# Patient Record
Sex: FEMALE | Race: WHITE
[De-identification: names, ages, dates, MRNs, and addresses within clinical notes are randomized per-mention and may not be internally consistent; named-entity substitution may affect disease eponyms.]

---

## 2018-01-11 ENCOUNTER — HOSPITAL ENCOUNTER (OUTPATIENT)
Dept: HOSPITAL 45 - C.PATHSPEC | Age: 23
Discharge: HOME | End: 2018-01-11
Attending: SURGERY
Payer: COMMERCIAL

## 2018-01-11 DIAGNOSIS — L72.0: Primary | ICD-10-CM

## 2018-01-23 ENCOUNTER — HOSPITAL ENCOUNTER (EMERGENCY)
Dept: HOSPITAL 45 - C.EDB | Age: 23
Discharge: HOME | End: 2018-01-23
Payer: COMMERCIAL

## 2018-01-23 VITALS
DIASTOLIC BLOOD PRESSURE: 74 MMHG | SYSTOLIC BLOOD PRESSURE: 117 MMHG | HEART RATE: 113 BPM | TEMPERATURE: 100.58 F | OXYGEN SATURATION: 98 %

## 2018-01-23 VITALS
HEIGHT: 67.01 IN | HEIGHT: 67.01 IN | WEIGHT: 223.11 LBS | BODY MASS INDEX: 35.02 KG/M2 | WEIGHT: 223.11 LBS | BODY MASS INDEX: 35.02 KG/M2

## 2018-01-23 DIAGNOSIS — R05: Primary | ICD-10-CM

## 2018-01-23 DIAGNOSIS — R50.9: ICD-10-CM

## 2018-01-23 DIAGNOSIS — Z87.01: ICD-10-CM

## 2018-01-23 DIAGNOSIS — R19.7: ICD-10-CM

## 2018-01-23 DIAGNOSIS — R52: ICD-10-CM

## 2018-01-23 DIAGNOSIS — J45.909: ICD-10-CM

## 2018-01-23 NOTE — DIAGNOSTIC IMAGING REPORT
CHEST ONE VIEW PORTABLE



CLINICAL HISTORY: Cough and fever. Sore throat.    



COMPARISON STUDY:  No previous studies for comparison.



FINDINGS: Patient is rotated. Lung volumes are normal. No pneumothorax or

pleural effusion is noted.

Pulmonary vascularity is normal. Lungs are clear. Cardiomediastinal silhouette

is unremarkable. 



IMPRESSION:  No acute cardiopulmonary findings. 









Electronically signed by:  Jovany Somers M.D.

1/23/2018 9:04 PM



Dictated Date/Time:  1/23/2018 9:04 PM

## 2018-01-23 NOTE — EMERGENCY ROOM VISIT NOTE
History


Report prepared by Jerrod:  Sima Martinez


Under the Supervision of:  Dr. Bertram Berry M.D.


First contact with patient:  20:29


Chief Complaint:  FLU LIKE SX


Stated Complaint:  SORE THROAT,FEVER,CONGESTION,RANDOM SOB





History of Present Illness


The patient is a 22 year old female who presents to the Emergency Room with 

complaints of constant generalized illness beginning three days ago. The 

patient is in the room with a friend. The patient reports a runny nose, a dry 

cough, diarrhea, fever, chills, and body aches. She notes slight chest 

tightness and difficulty breathing beginning yesterday. Today, the patient 

started to have a sorethroat. She denies any ear pain, vomiting, or urinary 

symptoms. The patient got a flu shot this year. She denies any sick contact. 

The patient has a history of asthma and pneumonia when she was a young child.





   Source of History:  patient


   Onset:  three days ago


   Position:  other (generalized)


   Quality:  other (illness)


   Timing:  other (constant)


   Associated Symptoms:  + fevers, + chills, + sorethroat, + cough, + SOB, + 

diarrhea, No vomiting, No urinary symptoms





Review of Systems


See HPI for pertinent positives & negatives. A total of 10 systems reviewed and 

were otherwise negative.





Past Medical & Surgical


Medical Problems:


(1) Asthma








Family History





Patient reports no known family medical history.





Social History


Smoking Status:  Never Smoker


Occupation Status:  [employer] student





Current/Historical Medications


Scheduled


Vitamin A (Vitamin A), 1 CAP PO DAILY





Scheduled PRN


Benzonatate (Tessalon Perles), 100-200 MG PO TID PRN for Cough





Allergies


Coded Allergies:  


     No Known Allergies (Unverified , 1/23/18)





Physical Exam


Vital Signs











  Date Time  Temp Pulse Resp B/P (MAP) Pulse Ox O2 Delivery O2 Flow Rate FiO2


 


1/23/18 22:09 38.1 113 20 117/47 98   


 


1/23/18 22:09 38.1 113 20 117/74 98 Room Air  


 


1/23/18 20:19 39.4 124 18 133/78 98 Room Air  











Physical Exam


GENERAL: Patient is in no acute distress.


HEENT: No acute trauma, normocephalic atraumatic, mucous membranes moist, no 

scleral icterus, moderate nasal congestion, no throat erythema or exudate.


NECK: No stridor, no adenopathy, no meningismus, trachea is midline.


LUNGS: Clear to auscultation bilaterally, no wheeze, no rhonchi, breath sounds 

equal.


HEART: Tachycardic with regular rhythm, no murmurs.


ABDOMEN: Soft, nontender, bowel sounds positive, no hernias, no peritonitis.


EXTREMITIES: No cyanosis or edema, full range of motion of all the joints 

without pain or difficulty, no signs for acute trauma.


NEUROLOGIC: Oriented x 3, no acute motor or sensory deficits, no focal weakness.


SKIN: No rash, no jaundice, no diaphoresis.





Medical Decision & Procedures


ER Provider


Diagnostic Interpretation:


Radiology results as stated below per my review and radiologist interpretation:





CHEST ONE VIEW PORTABLE





FINDINGS: Patient is rotated. Lung volumes are normal. No pneumothorax or


pleural effusion is noted.


Pulmonary vascularity is normal. Lungs are clear. Cardiomediastinal silhouette


is unremarkable. 





IMPRESSION:  No acute cardiopulmonary findings. 








Electronically signed by:  Jovany Somers M.D.





Medications Administered











 Medications


  (Trade)  Dose


 Ordered  Sig/[place]


 Route  Start Time


 Stop Time Status Last Admin


Dose Admin


 


 Ibuprofen


  (Motrin Tab)  600 mg  NOW  STAT


 PO  1/23/18 20:35


 1/23/18 20:37 DC 1/23/18 20:57


600 MG


 


 Acetaminophen


  (Tylenol Tab)  1,000 mg  NOW  STAT


 PO  1/23/18 20:35


 1/23/18 20:37 DC 1/23/18 20:56


1,000 MG


 


 Albuterol


  (Ventolin Hfa


 Inhaler)  3 puffs  NOW  ONCE


 INH  1/23/18 20:45


 1/23/18 20:46 DC 1/23/18 20:57


3 PUFFS


 


 Benzonatate


  (Tessalon Perles


 Cap)  100 mg  NOW  ONCE


 PO  1/23/18 20:45


 1/23/18 20:46 DC 1/23/18 20:57


100 MG











ED Course


2030: The patient was evaluated in room C6. A complete history and physical 

exam was performed.





2035: Ordered Acetaminophen 1000 mg PO, Ibuprofen 600 mg PO. 





2045: Ordered Benzonatate 100 mg PO, Albuterol 3 puffs INH. 





2133: I updated the patient on her test results. She is ready to go home. 





2153: Reevaluated the patient. Discussed results and discharge instructions: 

SHe verbalized understanding and agreement. The patient is ready for discharge.





Medical Decision


Differential diagnoses: influenza or flu-like symptoms, pneumonia, dehydration, 

pharyngitis, otitis media, sinusitis.





The patient presents with flulike symptoms.  On exam, she was febrile with a 

mild tachycardia.  Chest film does not show pneumonia.  She was not toxic.  No 

evidence for pharyngitis on exam.





The patient was given oral Motrin and oral Tylenol.  She was given albuterol 

via [facility].  She received Tessalon Perles orally for cough.





The patient's tachycardia has improved, her fever has improved.  I suspect she 

has influenza or something flulike.  She is out of the window though for 

Tamiflu.





The patient will be discharged with Motrin/Tylenol, rest, hydration, Tessalon 

Perles and albuterol.  If worsening, she can return.





Medication Reconcilliation


Current Medication List:  was personally reviewed by me





Blood Pressure Screening


Patient's blood pressure:  Elevated blood pressure


Blood pressure disposition:  Elevated BP felt to be situational





Impression





 Primary Impression:  


 Influenza-like symptoms





Scribe Attestation


The scribe's documentation has been prepared under my direction and personally 

reviewed by me in its entirety. I confirm that the note above accurately 

reflects all work, treatment, procedures, and medical decision making performed 

by me.





Departure Information


Dispostion


Home / Self-Care





Prescriptions





Benzonatate (TESSALON PERLES) 100 Mg Cap


100-200 MG PO TID Y for Cough, #16 CAP


   Prov: Bertram Berry M.D.         1/23/18





Referrals


No Doctor, Assigned (PCP)





Forms


HOME CARE DOCUMENTATION FORM,                                                 

               IMPORTANT VISIT INFORMATION





Patient Instructions


My [facility]





Additional Instructions





albuterol 3 puffs every 6 hours


fluids


rest


motrin and or tylenol for pain and fevers


tessalon perles 1-2 tab up to 3x per day for cough


return if worsening as we discussed


chest film was clear today

## 2018-01-24 ENCOUNTER — HOSPITAL ENCOUNTER (OUTPATIENT)
Dept: HOSPITAL 45 - C.LABSPEC | Age: 23
Discharge: HOME | End: 2018-01-24
Attending: NURSE PRACTITIONER
Payer: COMMERCIAL

## 2018-01-24 DIAGNOSIS — J02.9: Primary | ICD-10-CM

## 2018-04-03 ENCOUNTER — HOSPITAL ENCOUNTER (OUTPATIENT)
Dept: HOSPITAL 45 - C.LABSPEC | Age: 23
Discharge: HOME | End: 2018-04-03
Attending: NURSE PRACTITIONER
Payer: COMMERCIAL

## 2018-04-03 DIAGNOSIS — J02.9: Primary | ICD-10-CM
